# Patient Record
Sex: FEMALE | URBAN - METROPOLITAN AREA
[De-identification: names, ages, dates, MRNs, and addresses within clinical notes are randomized per-mention and may not be internally consistent; named-entity substitution may affect disease eponyms.]

---

## 2022-09-07 ENCOUNTER — OFFICE VISIT (OUTPATIENT)
Dept: ORTHOPEDIC SURGERY | Age: 47
End: 2022-09-07
Payer: OTHER MISCELLANEOUS

## 2022-09-07 DIAGNOSIS — S93.402A SPRAIN OF LEFT ANKLE, UNSPECIFIED LIGAMENT, INITIAL ENCOUNTER: ICD-10-CM

## 2022-09-07 DIAGNOSIS — M25.572 PAIN IN LEFT ANKLE AND JOINTS OF LEFT FOOT: Primary | ICD-10-CM

## 2022-09-07 PROCEDURE — 73630 X-RAY EXAM OF FOOT: CPT | Performed by: ORTHOPAEDIC SURGERY

## 2022-09-07 PROCEDURE — 73600 X-RAY EXAM OF ANKLE: CPT | Performed by: ORTHOPAEDIC SURGERY

## 2022-09-07 PROCEDURE — 99204 OFFICE O/P NEW MOD 45 MIN: CPT | Performed by: ORTHOPAEDIC SURGERY

## 2022-09-07 NOTE — LETTER
NOTIFICATION RETURN TO WORK / SCHOOL    9/7/2022 1:24 PM    Ms. Gopal Wheeler  [place] [address] [place] [address]      To Whom It May Concern:    Gopal Wheeler is currently under the care of Didi Salinas Marianna David Abarca for a left ankle     I recommend a sedentary position, if available, for the next 3 weeks while she undergoes treatment. If a sedentary position is not available, please allow her to remain out of work for 3 weeks until 09/28/2022. If there are questions or concerns please have the patient contact our office.         Sincerely,      Dulce Wong MD

## 2022-09-07 NOTE — PROGRESS NOTES
AMBULATORY PROGRESS NOTE      Patient: Nirmal Urrutia             MRN: [id number]     SSN: [id number] There is no height or weight on file to calculate BMI. YOB: 1975     AGE: 55 y.o. EX: female    PCP: None       IMPRESSION //  DIAGNOSIS AND TREATMENT PLAN      Nirmal Urrutia has a diagnosis of:      DIAGNOSES    1. Pain in left ankle and joints of left foot    2. Sprain of left ankle, unspecified ligament, initial encounter        Orders Placed This Encounter    Generic Supply Order     A left AS/AC brace will be given and placed on the patient.  Generic Supply Order     [employer] for HEP    AMB POC XRAY, FOOT; COMPLETE, 3+ VIEW     ASK ALL FEMALE PATIENTS IF PREGNANT     Order Specific Question:   Reason for Exam     Answer:   PAIN     Order Specific Question:   Is Patient Pregnant? Answer:   Unknown    POC XRAY, ANKLE; 2 VIEWS     Order Specific Question:   Reason for Exam     Answer:   pain     Order Specific Question:   Is Patient Pregnant? Answer:   Unknown    REFERRAL TO PHYSICAL THERAPY     Referral Priority:   Routine     Referral Type:   PT/OT/ST     Referral Reason:   Specialty Services Required     Requested Specialty:   Physical Therapy     Number of Visits Requested:   1          PLAN:    1. I obtained left ankle 2V and left foot 3V XR in the office today. 2. DME: A left AS/AC brace will be given and placed on the patient. 3. DME: [employer] for HEP  4. Referral to PT  5. Worknote: recommend sedentary position for 3 weeks    RTO 3 weeks    There are no Patient Instructions on file for this visit. Follow-up and Dispositions    Return in about 3 weeks (around 9/28/2022). Please follow up with your PCP for any health maintenance as recommended         Nirmal Urrutia  expresses understanding of the diagnosis, treatment plan, and all of their proposed questions were answered to their satisfaction.  Patient education has been provided re the diagnoses. HPI //  [address] A 55 y.o. female who is a/an  new patient, presenting to my outpatient office for evaluation of  the following chief complaint(s):     Chief Complaint   Patient presents with    Foot Pain     left     DOI: 08/13/2022    Nirmal Urrutia presents today with a left ankle injury. She reports that on 08/13/2022, she slipped on a puddle of water and inverted her ankle when clocking in at work. She states she worked the first half of her shift, then experienced throbbing in the left ankle with getting out of her car after lunch. She notes she took an OTC medication but her ankle continued to swell up, and she followed up at the ED. She underwent XR and was diagnosed with a left ankle sprain, then was contacted again and told her ankle was fractured. She currently complains of pain with inversion of the left hindfoot. She mentions a possibility of prior ankle injuries. 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 She is currently out of work because she cannot wear her CAM walker boot at work. There were no vitals taken for this visit. Appearance: Alert, well appearing and pleasant patient who is in no distress, oriented to person, place/time, and who follows commands. Normal dress/motor activity/thought processes/memory. This patient is accompanied in the examination room by her  self. Patient arrives to office via: with assistive device: left tall CAM walker boot    Psychiatric:  Normal Affect/mood. Judgement, behavior, and conduct are appropriate. Speech normal in context and clarity, memory intact grossly, no involuntary movements - tremors. H EENT (2): Head normocephalic & atraumatic.   Eye: pupils are round// EOM are intact // Neck: ROM WNL  // Hearings Intact   Respiratory: Breathing non labored     ANKLE/FOOT left    Gait: uses assistive device - left tall CAM walker boot  Tenderness: mild over the distal peroneal tendons  Cutaneous: Mild swelling over the anterolateral ankle. Joint Motion: Pain over the peroneal tendons with inversion   Joint / Tendon Stability:  No Ankle or Subtalar instability or joint laxity. No peroneal sublux ability or dislocation  Alignment: neutral Hindfoot,    Neuro Motor/Sensory: NL/NL  Vascular: NL foot/ankle pulses,   Lymphatics: No extremity lymphedema, No calf swelling, no tenderness to calf muscles. CHART REVIEW     Nohemy Perez has been experiencing pain and discomfort confirmed as outlined in the pain assessment outlined below.  was reviewed by Lawrence Salgado MD, on 9/7/2022. Pain Assessment  9/7/2022   Location of Pain Ankle; Foot   Location Modifiers Left   Severity of Pain 6   Quality of Pain Popping   Frequency of Pain Intermittent        Nohemy Perez  has no past medical history on file. Patients is employed at:          has no past medical history on file. has no past surgical history on file. family history is not on file. No current outpatient medications on file. No current facility-administered medications for this visit. Not on File  Social History     Occupational History    Not on file   Tobacco Use    Smoking status: Some Days     Types: Cigarettes    Smokeless tobacco: Never   Substance and Sexual Activity    Alcohol use: Not Currently    Drug use: Never    Sexual activity: Not on file       reports that she has been smoking cigarettes. She has never used smokeless tobacco. She reports that she does not currently use alcohol. She reports that she does not use drugs.       DIAGNOSTIC LAB DATA      No results found for: HBA1C, MJU9GPAZ, RSW4PTFE // No results found for: GLU, GLUCPOC     No results found for: JAQ5IHMT, CMT8DZFI      No results found for: VITD3, Messi Gutierrez, VD3RIA, GRYG82UIXVT     Drug Screen Most Recent Result Date    No resulted procedures found. REVIEW OF SYSTEMS : 2022  ALL BELOW ARE Negative except : SEE HPI     All other systems reviewed and are negative. 12 point review of systems otherwise negative unless noted in HPI. RADIOGRAPHS// IMAGING//DIAGNOSTIC DATA     Orders Placed This Encounter    Generic Supply Order    Generic Supply Order    AMB POC XRAY, FOOT; COMPLETE, 3+ VIEW    POC XRAY, ANKLE; 2 VIEWS    REFERRAL TO PHYSICAL THERAPY              X-rays, 3 views, left foot: AP, lateral bleak images: Nonweightbearing: Today 2022   Over alignment shows metatarsal adductus, I see no fracture sublease or dislocation, to the foot, forefoot midfoot, or hindfoot. I see some degenerative spurring seen the medial malleolus, when looking at the oblique foot when assessing the distal to the medial malleolus. X-rays, 2 view, left ankle, AP, oblique: Today nonweightbearin2022 : There is a bony spur seen the very distal tip of the malleolus, there are some soft tissue swelling, seen in the medial malleoli or deltoid ligament region, worse as well some calcific bone spur seen the very distal tip of the fibula at the lateral process of the talus. The overall alignment, the ankle, still appears to be anatomically aligned, when to get distal tibia overlap and distal tibia/fibular clear space. The medial clear space appears normal.  Again there is some soft to swelling, seen, more on the medial side and lateral  No osteolytic or osteoblastic lesions noted. Mineralization suggests no osteopenia. Degenerative changes are mild medial gutter noted. Calcified vessels are not present. I have personally reviewed the images of the above study.  The interpretation of this study is my professional opinion           I have spent 30 minutes reviewing the previous notes, reviewing diagnostic studies [Advanced  Imaging, Diagnostic test results (x-rays)] and had a direct face to face with the patient discussing the diagnosis and importance of compliance with the treatment and plan. There is  discussion for the potential for surgery, answering all questions, as well as documenting patient care coordination for this individual on the day of the visit. Disclaimer:     Sections of this note are dictated using utilizing voice recognition software, which may have resulted in some phonetic based errors in grammar and contents. Even though attempts were made to correct all the mistakes, some may have been missed, and remained in the body of the document. If questions arise, please contact our department. An electronic signature was used to authenticate this note. Rahul Mcguire may have a reminder for a \"due or due soon\" health maintenance. I have asked that she contact her primary care provider for follow-up on this health maintenance. There are no Patient Instructions on file for this visit. Follow-up and Dispositions    Return in about 3 weeks (around 9/28/2022). Please follow up with your PCP for any health maintenance as recommended.                 Scribed by Juli Wells, as dictated by Halle Holbrook MD.   9/7/2022  9:26 AM

## 2022-09-07 NOTE — PROGRESS NOTES
AMBULATORY PROGRESS NOTE      Patient: Ginny              MRN: [id number]     SSN: [id number] There is no height or weight on file to calculate BMI. YOB: 1975     AGE: 55 y.o. EX: female    PCP: None       IMPRESSION //  DIAGNOSIS AND TREATMENT PLAN      Ginny  has a diagnosis of:      DIAGNOSES    1. Pain in left ankle and joints of left foot    2. Sprain of left ankle, unspecified ligament, initial encounter        Orders Placed This Encounter    Generic Supply Order     A left AS/AC brace will be given and placed on the patient.  Generic Supply Order     Theraband for HEP    AMB POC XRAY, FOOT; COMPLETE, 3+ VIEW     ASK ALL FEMALE PATIENTS IF PREGNANT     Order Specific Question:   Reason for Exam     Answer:   PAIN     Order Specific Question:   Is Patient Pregnant? Answer:   Unknown    POC XRAY, ANKLE; 2 VIEWS     Order Specific Question:   Reason for Exam     Answer:   pain     Order Specific Question:   Is Patient Pregnant? Answer:   Unknown    REFERRAL TO PHYSICAL THERAPY     Referral Priority:   Routine     Referral Type:   PT/OT/ST     Referral Reason:   Specialty Services Required     Requested Specialty:   Physical Therapy     Number of Visits Requested:   1          RADIOGRAPHS// IMAGING//DIAGNOSTIC DATA     Orders Placed This Encounter    Generic Supply Order    Generic Supply Order    AMB POC XRAY, FOOT; COMPLETE, 3+ VIEW    POC XRAY, ANKLE; 2 VIEWS    REFERRAL TO PHYSICAL THERAPY              X-rays, 3 views, left foot: AP, lateral bleak images: Nonweightbearing: Today 2022   Over alignment shows metatarsal adductus, I see no fracture sublease or dislocation, to the foot, forefoot midfoot, or hindfoot. I see some degenerative spurring seen the medial malleolus, when looking at the oblique foot when assessing the distal to the medial malleolus. X-rays, 2 view, left ankle, AP, oblique:  Today nonweightbearin2022 :    There is a bony spur seen the very distal tip of the malleolus, there are some soft tissue swelling, seen in the medial malleoli or deltoid ligament region, worse as well some calcific bone spur seen the very distal tip of the fibula at the lateral process of the talus. The overall alignment, the ankle, still appears to be anatomically aligned, when to get distal tibia overlap and distal tibia/fibular clear space. The medial clear space appears normal.  Again there is some soft to swelling, seen, more on the medial side and lateral  No osteolytic or osteoblastic lesions noted. Mineralization suggests no osteopenia. Degenerative changes are mild medial gutter noted. Calcified vessels are not present. I have personally reviewed the images of the above study.  The interpretation of this study is my professional opinion           Wendy Burciaga MD  9/7/2022  1:26 PM

## 2022-09-19 ENCOUNTER — TELEPHONE (OUTPATIENT)
Dept: ORTHOPEDIC SURGERY | Age: 47
End: 2022-09-19

## 2022-09-19 NOTE — TELEPHONE ENCOUNTER
Left airsport brace left at [facility] . Patient notified. Please have patient sign LMN once she picks it up.

## 2022-09-19 NOTE — TELEPHONE ENCOUNTER
Patient stated she was unable to get brace. [facility]'t [facility] any, [facility] said she would have to pay half up front. Patient would like to know if we have more braces in the office. Patient can be reached at [phone].